# Patient Record
Sex: MALE | Race: WHITE | NOT HISPANIC OR LATINO | URBAN - METROPOLITAN AREA
[De-identification: names, ages, dates, MRNs, and addresses within clinical notes are randomized per-mention and may not be internally consistent; named-entity substitution may affect disease eponyms.]

---

## 2017-12-19 ENCOUNTER — OFFICE VISIT - RIVER FALLS (OUTPATIENT)
Dept: FAMILY MEDICINE | Facility: CLINIC | Age: 57
End: 2017-12-19

## 2017-12-19 ASSESSMENT — MIFFLIN-ST. JEOR: SCORE: 1565.57

## 2017-12-20 LAB
CHOLEST SERPL-MCNC: 167 MG/DL
CHOLEST/HDLC SERPL: 4.8 {RATIO}
CREAT SERPL-MCNC: 1.38 MG/DL (ref 0.7–1.33)
GLUCOSE BLD-MCNC: 186 MG/DL (ref 65–99)
HBA1C MFR BLD: 7.3 %
HDLC SERPL-MCNC: 35 MG/DL
LDLC SERPL CALC-MCNC: 96 MG/DL
NONHDLC SERPL-MCNC: 132 MG/DL
PSA SERPL-MCNC: 0.3 NG/ML
TRIGL SERPL-MCNC: 239 MG/DL

## 2018-03-30 ENCOUNTER — OFFICE VISIT - RIVER FALLS (OUTPATIENT)
Dept: FAMILY MEDICINE | Facility: CLINIC | Age: 58
End: 2018-03-30

## 2018-03-30 ASSESSMENT — MIFFLIN-ST. JEOR: SCORE: 1569.2

## 2018-04-13 ENCOUNTER — AMBULATORY - RIVER FALLS (OUTPATIENT)
Dept: FAMILY MEDICINE | Facility: CLINIC | Age: 58
End: 2018-04-13

## 2018-04-14 LAB
CREAT SERPL-MCNC: 1.45 MG/DL (ref 0.7–1.33)
HBA1C MFR BLD: 7.4 %
PSA SERPL-MCNC: 0.4 NG/ML

## 2022-02-11 VITALS
DIASTOLIC BLOOD PRESSURE: 60 MMHG | BODY MASS INDEX: 25.59 KG/M2 | SYSTOLIC BLOOD PRESSURE: 122 MMHG | HEART RATE: 80 BPM | HEIGHT: 69 IN | TEMPERATURE: 97.2 F | WEIGHT: 172.8 LBS

## 2022-02-11 VITALS
DIASTOLIC BLOOD PRESSURE: 81 MMHG | WEIGHT: 172 LBS | HEART RATE: 92 BPM | TEMPERATURE: 97.1 F | SYSTOLIC BLOOD PRESSURE: 134 MMHG | BODY MASS INDEX: 25.48 KG/M2 | HEIGHT: 69 IN

## 2022-02-16 NOTE — PROGRESS NOTES
Patient:   TRAVON HEALY            MRN: [id number]            FIN: [id number]               Age:   57 years     Sex:  Male     :  1960   Associated Diagnoses:   Microalbuminuria; Hyperlipidemia; Diabetes Mellitus, Type 2; Erectile Dysfunction   Author:   Arpit Zelaya MD      Visit Information   Visit type:  Scheduled follow-up.    Accompanied by:  No one.    Source of history:  Self.    History limitation:  None.       Chief Complaint   3/30/2018 11:33 AM CDT   F/u labs, chronic disease visit      History of Present Illness    The patient is here for follow up of his diabetes.  He has felt well and remains physically active.  His weight has been stable.  His urine microalbumin has remained elevated and hemoglobin A1c above 7.  We spent some time discussing the need to begin to treat these factors.  I also discussed the need for annual dilated eye exam.     In regard to prostate screening he wishes PSA and nothing further.  He will be due for Cologuard test in late .          Review of Systems   Constitutional:  Negative.    Eye:  Negative.    Respiratory:  No shortness of breath, No cough.    Cardiovascular:  No chest pain, No peripheral edema.    Gastrointestinal:  No nausea, No vomiting, No diarrhea.    Genitourinary:  Erectile dysfunction.    Endocrine:  Negative.    Musculoskeletal:  No muscle pain.    Neurologic:  Negative.       Health Status   Allergies:    Nonallergic Reactions (Selected)  Severity Not Documented  Lisinopril (Cough.....)   Medications:  (Selected)   Prescriptions  Prescribed  Diabetic test strips and lancets: Diabetic test strips and lancets, See Instructions, Instructions: Daily, Supply, # 90 EA, 3 Refill(s), Type: Maintenance, Pharmacy: [id number], Daily  Januvia 50 mg oral tablet: 1 tab(s) ( 50 mg ), po, daily, # 90 tab(s), 3 Refill(s), Type: Maintenance, Pharmacy: [id number], 1 tab(s) po daily  Viagra 50 mg oral tablet: 1 tab(s) ( 50 mg ),  po, daily, Instructions: 1 hour before sexual activity, PRN: Other (see comment), # 18 EA, 5 Refill(s), Type: Maintenance, Pharmacy: [id number], 1 tab(s) po daily,PRN:Other (see comment),Instr:1 hour before sexual a...  aspirin 81 mg oral tablet: 1 tab(s) ( 81 mg ), PO, Daily, # 30 tab(s), 0 Refill(s), Type: Maintenance, OTC (Rx)  atorvastatin 20 mg oral tablet: 1 tab(s) ( 20 mg ), PO, Daily, # 90 tab(s), 3 Refill(s), Type: Maintenance, Pharmacy: [facility] [id number], 1 tab(s) po daily  losartan 25 mg oral tablet: 1 tab(s) ( 25 mg ), PO, Daily, # 90 tab(s), 3 Refill(s), Type: Maintenance, Pharmacy: [facility] [id number], 1 tab(s) po daily  metFORMIN 500 mg oral tablet: 2 tab(s) ( 1,000 mg ), po, bid, # 360 tab(s), 3 Refill(s), Type: Soft Stop, Pharmacy: [id number], 2 tab(s) po bid,x90 day(s)  Documented Medications  Documented  Daily Multiple Vitamins: po, daily, 0 Refill(s), Type: Maintenance   Problem list:    All Problems  Diabetes Mellitus, Type 2 / ICD-9-.00 / Confirmed  Erectile Dysfunction / ICD-9-.84 / Confirmed  Hyperlipidemia / SNOMED CT 18830627 / Confirmed  Microalbuminuria / SNOMED CT 29151619 / Confirmed      Histories   Past Medical History:    No active or resolved past medical history items have been selected or recorded.   Family History:    Brother: Fabrizio      History is negative.  Sister: Kasey      History is negative.  Sister: Alba      History is negative.  Sister: Lien      History is negative.  Mother  Hypercholesterolemia  Father:  ()  Age at Death: 65 years.   Diabetes mellitus  Rheumatic heart disease  Brother: Rashi    Crohn's disease  Daughter  Diabetes mellitus     Procedure history:    Screening for malignant neoplasm of colon ([id number]) on 10/18/2015 at 55 Years.  Comments:  10/30/2015 4:14 PM - Inessa Copeland is negative  wisdom teeth extraction in  at 19 Years.  Vasectomy ([id number]).   Social  History:        Alcohol Assessment            1-2 times per year      Tobacco Assessment            Never      Employment and Education Assessment            Employed, Work/School description:  of [employer].        Physical Examination   Vital Signs   3/30/2018 11:33 AM CDT Temperature Tympanic 97.2 DegF  LOW    Peripheral Pulse Rate 80 bpm    Pulse Site Radial artery    HR Method Manual    Systolic Blood Pressure 122 mmHg    Diastolic Blood Pressure 60 mmHg    Mean Arterial Pressure 81 mmHg    BP Site Right arm    BP Method Manual      Measurements from flowsheet : Measurements   3/30/2018 11:33 AM CDT Height Measured - Standard 69 in    Weight Measured - Standard 172.8 lb    BSA 1.95 m2    Body Mass Index 25.52 kg/m2  HI      General:  Alert and oriented, No acute distress.    Eye:  Normal conjunctiva.    HENT:  Normocephalic, Normal hearing.    Neck:  Supple, Non-tender, No carotid bruit, No thyromegaly.    Respiratory:  Lungs are clear to auscultation, Respirations are non-labored.    Cardiovascular:  Normal rate, Regular rhythm, No murmur, No gallop, Normal peripheral perfusion, No edema.    Gastrointestinal:  Soft, Non-tender, No organomegaly.    Musculoskeletal:  No deformity, Normal gait.    Feet:  Normal by visual exam, Normal pulses, Sensation intact, By monofilament exam, By vibration.    Neurologic:  No focal deficits.    Psychiatric:  Cooperative, Appropriate mood & affect.       Review / Management   Results review:  Lab results   12/19/2017 12:52 PM CST Sodium Level 137 mmol/L    Potassium Level 4.1 mmol/L    Chloride Level 101 mmol/L    CO2 Level 27 mmol/L    Glucose Level 186 mg/dL  HI    BUN 28 mg/dL  HI    Creatinine Level 1.38 mg/dL  HI    BUN/Creat Ratio 20    eGFR 56 mL/min/1.73m2  LOW    eGFR African American 64 mL/min/1.73m2    Calcium Level 8.8 mg/dL    Hgb A1c 7.3  HI    Cholesterol 167 mg/dL    Non-HDL Cholesterol 132  HI    HDL 35 mg/dL  LOW    Cholesterol/HDL Ratio 4.8    LDL  96    Triglyceride 239 mg/dL  HI    PSA 0.3 ng/mL    U Microalbumin 9.5 mg/dL    Ur Creatinine 107 mg/dL    Ur Microalbumin/Creatinine Ratio 89  HI   12/12/2016 9:11 AM CST Creatinine Level 1.24 mg/dL    eGFR 64 mL/min/1.73m2    eGFR African American 74 mL/min/1.73m2    Hgb A1c 7.2  HI    Cholesterol 152 mg/dL    Non-HDL Cholesterol 117    HDL 35 mg/dL  LOW    Cholesterol/HDL Ratio 4.3    LDL 83    Triglyceride 172 mg/dL  HI    U Microalbumin 16.3 mg/dL    Ur Creatinine 184 mg/dL    Ur Microalbumin/Creatinine Ratio 89  HI   .       Impression and Plan   Diagnosis     Microalbuminuria (HTW65-WC R80.9).     Hyperlipidemia (QPV84-NI E78.5).     Diabetes Mellitus, Type 2 (FNZ93-JO E11.9).     Erectile Dysfunction (WEV84-RX N52.9).     Patient Instructions:       Counseled: Patient, Diet, Activity, Verbalized understanding, weight  loss.    Summary:  persistent microalbuminuria and Hgb A1c > 7.    Orders     Orders (Selected)   Outpatient Orders  Ordered  Return to Clinic (Request): RFV: K+, Creat, PSA, Return in 1-2 weeks  Return to Clinic (Request): RFV: Wellness, Return in 3-6 months  Return to Office (Request): RFV: Hgb A1c, lipids, SENIA, alt, K+, creat, Return in 3 months  Prescriptions  Prescribed  Diabetic test strips and lancets: Diabetic test strips and lancets, See Instructions, Instructions: Daily, Supply, # 90 EA, 3 Refill(s), Type: Maintenance, Pharmacy: [facility] [id number], Daily  Januvia 50 mg oral tablet: 1 tab(s) ( 50 mg ), po, daily, # 90 tab(s), 3 Refill(s), Type: Maintenance, Pharmacy: [facility] [id number], 1 tab(s) po daily  Viagra 50 mg oral tablet: 1 tab(s) ( 50 mg ), po, daily, Instructions: 1 hour before sexual activity, PRN: Other (see comment), # 18 EA, 5 Refill(s), Type: Maintenance, Pharmacy: [facility] [id number], 1 tab(s) po daily,PRN:Other (see comment),Instr:1 hour before sexual a...  aspirin 81 mg oral tablet: 1 tab(s) ( 81 mg ), PO, Daily, # 30 tab(s), 0 Refill(s),  Type: Maintenance, OTC (Rx)  atorvastatin 20 mg oral tablet: 1 tab(s) ( 20 mg ), PO, Daily, # 90 tab(s), 3 Refill(s), Type: Maintenance, Pharmacy: [facility] [id number], 1 tab(s) po daily  losartan 25 mg oral tablet: 1 tab(s) ( 25 mg ), PO, Daily, # 90 tab(s), 3 Refill(s), Type: Maintenance, Pharmacy: [facility] [id number], 1 tab(s) po daily  metFORMIN 500 mg oral tablet: 2 tab(s) ( 1,000 mg ), po, bid, # 360 tab(s), 3 Refill(s), Type: Soft Stop, Pharmacy: [facility] [id number], 2 tab(s) po bid,x90 day(s)  Discontinued  simvastatin 20 mg oral tablet: 1 tab(s) ( 20 mg ), po, hs, # 14 tab(s), 0 Refill(s), Type: Soft Stop, Pharmacy: [facility] [id number], 2 week protocol. Pt notified, 1 tab(s) po hs  Documented Medications  Documented  Daily Multiple Vitamins: po, daily, 0 Refill(s), Type: Maintenance.

## 2022-02-16 NOTE — CARE COORDINATION
Pt appears on [facility] chronic disease panel as out of parameters for LDL date.  Pt is overdue for CDV/AWV/fasting labs.  Called to pt to follow up, he states that he lives in [place] and will be changing  clinics closer to home.  Will be sending for records when he gets appt at new clinic.

## 2022-02-16 NOTE — PROGRESS NOTES
Patient:   TRAVON HEALY            MRN: [id number]            FIN: [id number]               Age:   57 years     Sex:  Male     :  1960   Associated Diagnoses:   None   Author:   Cameron Singer MD      Visit Information      Date of Service: 2017 12:00 pm  Performing Location: [facility]  Encounter#: [id number]      Primary Care Provider (PCP):  Arpit Zelaya MD    NPI# [id number]      Referring Provider:  Cameron Singer MD    NPI# [id number]      Chief Complaint   2017 12:07 PM CST  DM med check.        History of Present Illness   CC as above and reviewed w  patient   Pt requests refill of metformin and simvastatin. Things have been going well on these meds  denies vision issues, feet numbness or tingling  remarks on a sudden feeling of shortness of breath aboiu amonth ago lasting several minutes, since he's had some mid-upper back pain that is worse with movement,. espcially sitting up. Not very bothersome. No further shrotnerss of breath or cough.      Review of Systems            Physical Examination   Vital Signs   2017 12:07 PM CST Temperature Tympanic 97.1 DegF  LOW    Peripheral Pulse Rate 92 bpm    Systolic Blood Pressure 134 mmHg  HI    Diastolic Blood Pressure 81 mmHg  HI    Mean Arterial Pressure 99 mmHg      Measurements from flowsheet : Measurements   2017 12:07 PM CST Height Measured - Standard 69 in    Weight Measured - Standard 172 lb    BSA 1.95 m2    Body Mass Index 25.4 kg/m2      Appearing well  Heart rrr w/o murmurs  lungs ctab  spine nontender  feet with normal microfilament exam, good pulses      Impression and Plan   Diabetes - check a1c and other labs. Metformin refilled  Preventative health care. Due for CRC screening next year. Would like to do PSA. Flu shot today.  Back pain - sounds muscular in etiology given the worsening with seratin movements. Pt will monitor this for now. EKG was done which was normal.

## 2022-02-16 NOTE — PROGRESS NOTES
Patient:   TRAVON HEALY            MRN: [id number]            FIN: [id number]               Age:   57 years     Sex:  Male     :  1960   Associated Diagnoses:   None   Author:   Cameron Singer MD      Procedure   EKG procedure   Date:  2017.     EKG findings   Interpretation: Cameron Singer MD.     Interpretation: normal EKG.